# Patient Record
Sex: FEMALE | Race: BLACK OR AFRICAN AMERICAN | NOT HISPANIC OR LATINO | URBAN - METROPOLITAN AREA
[De-identification: names, ages, dates, MRNs, and addresses within clinical notes are randomized per-mention and may not be internally consistent; named-entity substitution may affect disease eponyms.]

---

## 2021-06-14 ENCOUNTER — TELEPHONE ENCOUNTER (OUTPATIENT)
Dept: URBAN - METROPOLITAN AREA CLINIC 23 | Facility: CLINIC | Age: 51
End: 2021-06-14

## 2021-06-16 ENCOUNTER — WEB ENCOUNTER (OUTPATIENT)
Dept: URBAN - METROPOLITAN AREA CLINIC 115 | Facility: CLINIC | Age: 51
End: 2021-06-16

## 2021-06-16 ENCOUNTER — LAB OUTSIDE AN ENCOUNTER (OUTPATIENT)
Dept: URBAN - METROPOLITAN AREA CLINIC 115 | Facility: CLINIC | Age: 51
End: 2021-06-16

## 2021-06-16 ENCOUNTER — OFFICE VISIT (OUTPATIENT)
Dept: URBAN - METROPOLITAN AREA CLINIC 115 | Facility: CLINIC | Age: 51
End: 2021-06-16
Payer: COMMERCIAL

## 2021-06-16 DIAGNOSIS — K58.1 IRRITABLE BOWEL SYNDROME WITH CONSTIPATION: ICD-10-CM

## 2021-06-16 DIAGNOSIS — R10.32 LLQ PAIN: ICD-10-CM

## 2021-06-16 DIAGNOSIS — K59.01 CONSTIPATION BY DELAYED COLONIC TRANSIT: ICD-10-CM

## 2021-06-16 DIAGNOSIS — K92.1 MELENA: ICD-10-CM

## 2021-06-16 DIAGNOSIS — Z83.71 FAMILY HISTORY OF COLONIC POLYPS: ICD-10-CM

## 2021-06-16 PROBLEM — 429969003: Status: ACTIVE | Noted: 2021-06-16

## 2021-06-16 PROBLEM — 440630006: Status: ACTIVE | Noted: 2021-06-16

## 2021-06-16 PROBLEM — 301716002: Status: ACTIVE | Noted: 2021-06-16

## 2021-06-16 PROBLEM — 35298007: Status: ACTIVE | Noted: 2021-06-16

## 2021-06-16 PROCEDURE — 99204 OFFICE O/P NEW MOD 45 MIN: CPT | Performed by: INTERNAL MEDICINE

## 2021-06-16 RX ORDER — HYOSCYAMINE SULFATE 0.12 MG/1
1 TABLET UNDER THE TONGUE AND ALLOW TO DISSOLVE  AS NEEDED FOR CRAMPING PAIN TABLET, ORALLY DISINTEGRATING ORAL THREE TIMES A DAY
Qty: 60 TABLET | Refills: 1 | OUTPATIENT
Start: 2021-06-16 | End: 2021-08-15

## 2021-06-16 RX ORDER — PANTOPRAZOLE SODIUM 40 MG/1
1 TABLET TABLET, DELAYED RELEASE ORAL ONCE A DAY
Qty: 30 TABLET | Refills: 1 | OUTPATIENT
Start: 2021-06-16

## 2021-06-16 RX ORDER — LINACLOTIDE 145 UG/1
1 CAPSULE AT LEAST 30 MINUTES BEFORE THE FIRST MEAL OF THE DAY ON AN EMPTY STOMACH CAPSULE, GELATIN COATED ORAL ONCE A DAY
Qty: 90 CAPSULE | Refills: 1 | OUTPATIENT
Start: 2021-06-16 | End: 2021-12-12

## 2021-06-17 ENCOUNTER — OFFICE VISIT (OUTPATIENT)
Dept: URBAN - METROPOLITAN AREA TELEHEALTH 2 | Facility: TELEHEALTH | Age: 51
End: 2021-06-17

## 2021-06-17 ENCOUNTER — CLAIMS CREATED FROM THE CLAIM WINDOW (OUTPATIENT)
Dept: URBAN - METROPOLITAN AREA CLINIC 4 | Facility: CLINIC | Age: 51
End: 2021-06-17
Payer: COMMERCIAL

## 2021-06-17 ENCOUNTER — OFFICE VISIT (OUTPATIENT)
Dept: URBAN - METROPOLITAN AREA SURGERY CENTER 13 | Facility: SURGERY CENTER | Age: 51
End: 2021-06-17
Payer: COMMERCIAL

## 2021-06-17 ENCOUNTER — TELEPHONE ENCOUNTER (OUTPATIENT)
Dept: URBAN - METROPOLITAN AREA CLINIC 92 | Facility: CLINIC | Age: 51
End: 2021-06-17

## 2021-06-17 DIAGNOSIS — K92.1 BLACK STOOL: ICD-10-CM

## 2021-06-17 DIAGNOSIS — K22.8 COLUMNAR-LINED ESOPHAGUS: ICD-10-CM

## 2021-06-17 DIAGNOSIS — R10.13 ABDOMINAL DISCOMFORT, EPIGASTRIC: ICD-10-CM

## 2021-06-17 DIAGNOSIS — K31.89 ACQUIRED DEFORMITY OF DUODENUM: ICD-10-CM

## 2021-06-17 DIAGNOSIS — K31.89 MESENTEROAXIAL GASTRIC VOLVULUS: ICD-10-CM

## 2021-06-17 LAB
A/G RATIO: 1.4
ALBUMIN: 4.3
ALKALINE PHOSPHATASE: 58
ALT (SGPT): 26
AST (SGOT): 15
BASO (ABSOLUTE): 0
BASOS: 0
BILIRUBIN, TOTAL: <0.2
BUN/CREATININE RATIO: 11
BUN: 12
CALCIUM: 9.9
CARBON DIOXIDE, TOTAL: 22
CHLORIDE: 101
CREATININE: 1.06
EGFR IF AFRICN AM: 72
EGFR IF NONAFRICN AM: 62
EOS (ABSOLUTE): 0
EOS: 0
GLOBULIN, TOTAL: 3.1
GLUCOSE: 123
HEMATOCRIT: 35.6
HEMATOLOGY COMMENTS:: (no result)
HEMOGLOBIN: 11.6
IMMATURE CELLS: (no result)
IMMATURE GRANS (ABS): 0.1
IMMATURE GRANULOCYTES: 1
LYMPHS (ABSOLUTE): 1.6
LYMPHS: 12
MCH: 27.8
MCHC: 32.6
MCV: 85
MONOCYTES(ABSOLUTE): 0.8
MONOCYTES: 6
NEUTROPHILS (ABSOLUTE): 11
NEUTROPHILS: 81
NRBC: (no result)
PLATELETS: 359
POTASSIUM: 4.5
PROTEIN, TOTAL: 7.4
RBC: 4.18
RDW: 14.3
SODIUM: 139
WBC: 13.6

## 2021-06-17 PROCEDURE — 88312 SPECIAL STAINS GROUP 1: CPT | Performed by: PATHOLOGY

## 2021-06-17 PROCEDURE — 88305 TISSUE EXAM BY PATHOLOGIST: CPT | Performed by: PATHOLOGY

## 2021-06-17 PROCEDURE — 43239 EGD BIOPSY SINGLE/MULTIPLE: CPT | Performed by: INTERNAL MEDICINE

## 2021-06-17 PROCEDURE — G8907 PT DOC NO EVENTS ON DISCHARG: HCPCS | Performed by: INTERNAL MEDICINE

## 2021-06-17 RX ORDER — HYOSCYAMINE SULFATE 0.12 MG/1
1 TABLET UNDER THE TONGUE AND ALLOW TO DISSOLVE  AS NEEDED FOR CRAMPING PAIN TABLET, ORALLY DISINTEGRATING ORAL THREE TIMES A DAY
Qty: 60 TABLET | Refills: 1 | Status: ACTIVE | COMMUNITY
Start: 2021-06-16 | End: 2021-08-15

## 2021-06-17 RX ORDER — PANTOPRAZOLE SODIUM 40 MG/1
1 TABLET TABLET, DELAYED RELEASE ORAL ONCE A DAY
Qty: 30 TABLET | Refills: 1 | Status: ACTIVE | COMMUNITY
Start: 2021-06-16

## 2021-06-17 RX ORDER — LINACLOTIDE 145 UG/1
1 CAPSULE AT LEAST 30 MINUTES BEFORE THE FIRST MEAL OF THE DAY ON AN EMPTY STOMACH CAPSULE, GELATIN COATED ORAL ONCE A DAY
Qty: 90 CAPSULE | Refills: 1 | Status: ACTIVE | COMMUNITY
Start: 2021-06-16 | End: 2021-12-12

## 2021-06-17 RX ORDER — SUCRALFATE 1 G
1 TABLET ON AN EMPTY STOMACH TABLET ORAL TWICE A DAY
Qty: 60 TABLET | Refills: 0 | OUTPATIENT
Start: 2021-06-17 | End: 2021-07-17

## 2021-06-21 ENCOUNTER — TELEPHONE ENCOUNTER (OUTPATIENT)
Dept: URBAN - METROPOLITAN AREA CLINIC 92 | Facility: CLINIC | Age: 51
End: 2021-06-21

## 2023-11-21 ENCOUNTER — LAB OUTSIDE AN ENCOUNTER (OUTPATIENT)
Dept: URBAN - METROPOLITAN AREA CLINIC 82 | Facility: CLINIC | Age: 53
End: 2023-11-21

## 2023-11-21 ENCOUNTER — OFFICE VISIT (OUTPATIENT)
Dept: URBAN - METROPOLITAN AREA CLINIC 82 | Facility: CLINIC | Age: 53
End: 2023-11-21
Payer: COMMERCIAL

## 2023-11-21 ENCOUNTER — DASHBOARD ENCOUNTERS (OUTPATIENT)
Age: 53
End: 2023-11-21

## 2023-11-21 VITALS
WEIGHT: 202.6 LBS | TEMPERATURE: 96.8 F | DIASTOLIC BLOOD PRESSURE: 84 MMHG | SYSTOLIC BLOOD PRESSURE: 141 MMHG | HEIGHT: 63 IN | BODY MASS INDEX: 35.9 KG/M2 | HEART RATE: 86 BPM

## 2023-11-21 DIAGNOSIS — K58.1 IRRITABLE BOWEL SYNDROME WITH CONSTIPATION: ICD-10-CM

## 2023-11-21 DIAGNOSIS — Z83.718 FAMILY HISTORY OF FAMILIAL POLYPOSIS: ICD-10-CM

## 2023-11-21 DIAGNOSIS — Z12.11 COLON CANCER SCREENING: ICD-10-CM

## 2023-11-21 DIAGNOSIS — R10.13 DYSPEPSIA: ICD-10-CM

## 2023-11-21 PROBLEM — 305058001: Status: ACTIVE | Noted: 2023-11-21

## 2023-11-21 PROBLEM — 162031009: Status: ACTIVE | Noted: 2023-11-21

## 2023-11-21 PROBLEM — 25374005: Status: ACTIVE | Noted: 2023-11-21

## 2023-11-21 PROCEDURE — 99214 OFFICE O/P EST MOD 30 MIN: CPT | Performed by: INTERNAL MEDICINE

## 2023-11-21 RX ORDER — ONDANSETRON 8 MG/1
1 TABLET ON THE TONGUE AND ALLOW TO DISSOLVE  AS NEEDED TABLET, ORALLY DISINTEGRATING ORAL
Qty: 30 TABLET | Refills: 0 | OUTPATIENT
Start: 2023-11-21

## 2023-11-21 RX ORDER — PANTOPRAZOLE SODIUM 40 MG/1
1 TABLET TABLET, DELAYED RELEASE ORAL ONCE A DAY
Qty: 30 TABLET | Refills: 1 | Status: ACTIVE | COMMUNITY
Start: 2021-06-16

## 2023-11-21 RX ORDER — POLYETHYLENE GLYCOL 3350 17 G/DOSE
AS DIRECTED PRIOR TO COLONSOCOPY POWDER (GRAM) ORAL ONCE A DAY
Qty: 238  GRAM | Refills: 0 | OUTPATIENT
Start: 2023-11-21 | End: 2023-11-22

## 2023-11-21 RX ORDER — OMEPRAZOLE 40 MG/1
1 CAPSULE 30 MINUTES BEFORE MORNING MEAL CAPSULE, DELAYED RELEASE ORAL ONCE A DAY
Qty: 30 CAPSULE | Refills: 1 | OUTPATIENT
Start: 2023-11-21

## 2023-11-21 RX ORDER — PROMETHAZINE HYDROCHLORIDE 12.5 MG/1
1 TABLET AS NEEDED TABLET ORAL
Qty: 30 TABLET | Refills: 1 | OUTPATIENT
Start: 2023-11-21 | End: 2024-01-20

## 2023-11-21 NOTE — HPI-TODAY'S VISIT:
Alivia is a 52-year-old female was seen in the office for evaluation of recent episode of diarrhea abdominal cramping.  Patient was originally scheduled to have a routine screening colonoscopy evaluation.  Last month she had an episode of severe abdominal pain discomfort that led to urgent care and who referred her to ER.  Patient stated she had episodes of nausea vomiting as well as abdominal pain and discomfort.  She was on Ozempic in the past for prediabetes however she is not on any medication right now.  In the ER she was prescribed pantoprazole.  Patient stated she has had history of A-fib was on Xarelto however it was discontinued about several months ago by her cardiology.  Patient currently not on any anticoagulation.  She denies any active cardiac symptoms. she reports GI symptoms has resolved at this time except for flatulance. She denies any fhx of colon cancer or IBD. All recent hospital records reviewed.

## 2023-11-22 ENCOUNTER — TELEPHONE ENCOUNTER (OUTPATIENT)
Dept: URBAN - NONMETROPOLITAN AREA CLINIC 2 | Facility: CLINIC | Age: 53
End: 2023-11-22

## 2023-12-14 ENCOUNTER — ERX REFILL RESPONSE (OUTPATIENT)
Dept: URBAN - METROPOLITAN AREA CLINIC 82 | Facility: CLINIC | Age: 53
End: 2023-12-14

## 2023-12-14 ENCOUNTER — OUT OF OFFICE VISIT (OUTPATIENT)
Dept: URBAN - METROPOLITAN AREA SURGERY CENTER 13 | Facility: SURGERY CENTER | Age: 53
End: 2023-12-14
Payer: COMMERCIAL

## 2023-12-14 ENCOUNTER — CLAIMS CREATED FROM THE CLAIM WINDOW (OUTPATIENT)
Dept: URBAN - METROPOLITAN AREA SURGERY CENTER 13 | Facility: SURGERY CENTER | Age: 53
End: 2023-12-14

## 2023-12-14 ENCOUNTER — CLAIMS CREATED FROM THE CLAIM WINDOW (OUTPATIENT)
Dept: URBAN - METROPOLITAN AREA CLINIC 4 | Facility: CLINIC | Age: 53
End: 2023-12-14
Payer: COMMERCIAL

## 2023-12-14 DIAGNOSIS — K63.5 BENIGN COLON POLYP: ICD-10-CM

## 2023-12-14 DIAGNOSIS — K21.00 GERD WITH ESOPHAGITIS WITHOUT BLEEDING: ICD-10-CM

## 2023-12-14 DIAGNOSIS — K20.80 ESOPHAGITIS, LOS ANGELES GRADE A: ICD-10-CM

## 2023-12-14 DIAGNOSIS — K31.89 OTHER DISEASES OF STOMACH AND DUODENUM: ICD-10-CM

## 2023-12-14 DIAGNOSIS — D12.7 ADENOMA OF RECTOSIGMOID JUNCTION OF LARGE INTESTINE: ICD-10-CM

## 2023-12-14 DIAGNOSIS — K44.9 HIATAL HERNIA: ICD-10-CM

## 2023-12-14 DIAGNOSIS — Z12.11 ENCOUNTER FOR SCREENING FOR MALIGNANT NEOPLASM OF COLON: ICD-10-CM

## 2023-12-14 DIAGNOSIS — K64.1 GRADE II HEMORRHOIDS: ICD-10-CM

## 2023-12-14 DIAGNOSIS — K30 INDIGESTION: ICD-10-CM

## 2023-12-14 PROCEDURE — 43239 EGD BIOPSY SINGLE/MULTIPLE: CPT | Performed by: INTERNAL MEDICINE

## 2023-12-14 PROCEDURE — 45380 COLONOSCOPY AND BIOPSY: CPT | Performed by: INTERNAL MEDICINE

## 2023-12-14 PROCEDURE — G8907 PT DOC NO EVENTS ON DISCHARG: HCPCS | Performed by: INTERNAL MEDICINE

## 2023-12-14 PROCEDURE — 00813 ANES UPR LWR GI NDSC PX: CPT | Performed by: ANESTHESIOLOGY

## 2023-12-14 PROCEDURE — 88305 TISSUE EXAM BY PATHOLOGIST: CPT | Performed by: PATHOLOGY

## 2023-12-14 PROCEDURE — 00813 ANES UPR LWR GI NDSC PX: CPT | Performed by: ANESTHESIOLOGIST ASSISTANT

## 2023-12-14 PROCEDURE — 88312 SPECIAL STAINS GROUP 1: CPT | Performed by: PATHOLOGY

## 2023-12-14 RX ORDER — POLYETHYLENE GLYCOL 3350 17 G/DOSE
AS DIRECTED PRIOR TO COLONSOCOPY POWDER (GRAM) ORAL ONCE A DAY
Qty: 238  GRAM | Refills: 0 | OUTPATIENT

## 2023-12-14 RX ORDER — POLYETHYLENE GLYCOL 17 G/17G
USE AS DIRECTED PRIOR TO COLONOSCOPY FOR ONE DAY POWDER, FOR SUSPENSION ORAL
Qty: 238 GRAM | Refills: 1 | OUTPATIENT

## 2024-01-28 ENCOUNTER — ERX REFILL RESPONSE (OUTPATIENT)
Dept: URBAN - METROPOLITAN AREA CLINIC 82 | Facility: CLINIC | Age: 54
End: 2024-01-28

## 2024-01-28 RX ORDER — OMEPRAZOLE 40 MG/1
TAKE ONE CAPSULE BY MOUTH EVERY MORNING 30 MINUTES BEFORE MORNING MEAL CAPSULE, DELAYED RELEASE ORAL
Qty: 30 CAPSULE | Refills: 1 | OUTPATIENT

## 2024-01-28 RX ORDER — OMEPRAZOLE 40 MG/1
1 CAPSULE 30 MINUTES BEFORE MORNING MEAL CAPSULE, DELAYED RELEASE ORAL ONCE A DAY
Qty: 30 CAPSULE | Refills: 1 | OUTPATIENT